# Patient Record
Sex: FEMALE | Race: BLACK OR AFRICAN AMERICAN | NOT HISPANIC OR LATINO | URBAN - METROPOLITAN AREA
[De-identification: names, ages, dates, MRNs, and addresses within clinical notes are randomized per-mention and may not be internally consistent; named-entity substitution may affect disease eponyms.]

---

## 2020-02-06 ENCOUNTER — APPOINTMENT (RX ONLY)
Dept: URBAN - METROPOLITAN AREA CLINIC 1 | Facility: CLINIC | Age: 50
Setting detail: DERMATOLOGY
End: 2020-02-06

## 2020-02-06 DIAGNOSIS — L663 OTHER SPECIFIED DISEASES OF HAIR AND HAIR FOLLICLES: ICD-10-CM

## 2020-02-06 DIAGNOSIS — L73.9 FOLLICULAR DISORDER, UNSPECIFIED: ICD-10-CM

## 2020-02-06 DIAGNOSIS — L738 OTHER SPECIFIED DISEASES OF HAIR AND HAIR FOLLICLES: ICD-10-CM

## 2020-02-06 DIAGNOSIS — L65.9 NONSCARRING HAIR LOSS, UNSPECIFIED: ICD-10-CM | Status: WELL CONTROLLED

## 2020-02-06 DIAGNOSIS — L81.4 OTHER MELANIN HYPERPIGMENTATION: ICD-10-CM

## 2020-02-06 PROBLEM — L02.92 FURUNCLE, UNSPECIFIED: Status: ACTIVE | Noted: 2020-02-06

## 2020-02-06 PROCEDURE — 99213 OFFICE O/P EST LOW 20 MIN: CPT

## 2020-02-06 PROCEDURE — ? PRESCRIPTION

## 2020-02-06 PROCEDURE — ? COUNSELING

## 2020-02-06 RX ORDER — CLINDAMYCIN PHOSPHATE 10 MG/ML
SOLUTION TOPICAL
Qty: 1 | Refills: 3 | Status: ERX | COMMUNITY
Start: 2020-02-06

## 2020-02-06 RX ORDER — HYDROQUINONE 6% 6 G/100G
EMULSION TOPICAL
Qty: 1 | Refills: 0 | Status: ERX | COMMUNITY
Start: 2020-02-06

## 2020-02-06 RX ADMIN — CLINDAMYCIN PHOSPHATE: 10 SOLUTION TOPICAL at 00:00

## 2020-02-06 RX ADMIN — HYDROQUINONE 6%: 6 EMULSION TOPICAL at 00:00

## 2020-02-10 RX ORDER — CLINDAMYCIN PHOSPHATE 10 MG/ML
SOLUTION TOPICAL
Qty: 1 | Refills: 3 | Status: ERX

## 2020-12-02 ENCOUNTER — APPOINTMENT (RX ONLY)
Dept: URBAN - METROPOLITAN AREA CLINIC 33 | Facility: CLINIC | Age: 50
Setting detail: DERMATOLOGY
End: 2020-12-02

## 2020-12-02 DIAGNOSIS — L73.9 FOLLICULAR DISORDER, UNSPECIFIED: ICD-10-CM

## 2020-12-02 DIAGNOSIS — L738 OTHER SPECIFIED DISEASES OF HAIR AND HAIR FOLLICLES: ICD-10-CM

## 2020-12-02 DIAGNOSIS — L63.8 OTHER ALOPECIA AREATA: ICD-10-CM

## 2020-12-02 DIAGNOSIS — L663 OTHER SPECIFIED DISEASES OF HAIR AND HAIR FOLLICLES: ICD-10-CM

## 2020-12-02 PROBLEM — L02.92 FURUNCLE, UNSPECIFIED: Status: ACTIVE | Noted: 2020-12-02

## 2020-12-02 PROCEDURE — 99213 OFFICE O/P EST LOW 20 MIN: CPT | Mod: 95

## 2020-12-02 PROCEDURE — ? ADDITIONAL NOTES

## 2020-12-02 PROCEDURE — ? PRESCRIPTION

## 2020-12-02 RX ORDER — BETAMETHASONE DIPROPIONATE 0.5 MG/G
OINTMENT TOPICAL QHS
Qty: 1 | Refills: 3 | Status: ERX | COMMUNITY
Start: 2020-12-02

## 2020-12-02 RX ORDER — CLINDAMYCIN PHOSPHATE 10 MG/ML
SOLUTION TOPICAL
Qty: 1 | Refills: 3 | Status: ERX

## 2020-12-02 RX ADMIN — BETAMETHASONE DIPROPIONATE: 0.5 OINTMENT TOPICAL at 00:00

## 2020-12-02 NOTE — PROCEDURE: ADDITIONAL NOTES
Detail Level: Generalized
Additional Notes: Pt believes recent hair loss is due to collagen vitamin she tried. RJ explains this is likely not the cause\\n\\nPt will f/u in office for ILK injections

## 2020-12-10 ENCOUNTER — APPOINTMENT (RX ONLY)
Dept: URBAN - METROPOLITAN AREA CLINIC 33 | Facility: CLINIC | Age: 50
Setting detail: DERMATOLOGY
End: 2020-12-10

## 2020-12-10 DIAGNOSIS — L663 OTHER SPECIFIED DISEASES OF HAIR AND HAIR FOLLICLES: ICD-10-CM

## 2020-12-10 DIAGNOSIS — L73.9 FOLLICULAR DISORDER, UNSPECIFIED: ICD-10-CM

## 2020-12-10 DIAGNOSIS — L63.8 OTHER ALOPECIA AREATA: ICD-10-CM | Status: INADEQUATELY CONTROLLED

## 2020-12-10 DIAGNOSIS — L738 OTHER SPECIFIED DISEASES OF HAIR AND HAIR FOLLICLES: ICD-10-CM

## 2020-12-10 PROBLEM — L02.92 FURUNCLE, UNSPECIFIED: Status: ACTIVE | Noted: 2020-12-10

## 2020-12-10 PROCEDURE — ? COUNSELING

## 2020-12-10 PROCEDURE — 11901 INJECT SKIN LESIONS >7: CPT

## 2020-12-10 PROCEDURE — ? INTRALESIONAL KENALOG

## 2020-12-10 PROCEDURE — 99213 OFFICE O/P EST LOW 20 MIN: CPT | Mod: 25

## 2020-12-10 PROCEDURE — ? TREATMENT REGIMEN

## 2020-12-10 PROCEDURE — ? ADDITIONAL NOTES

## 2020-12-10 ASSESSMENT — LOCATION DETAILED DESCRIPTION DERM
LOCATION DETAILED: LEFT CENTRAL FRONTAL SCALP
LOCATION DETAILED: RIGHT CENTRAL PARIETAL SCALP
LOCATION DETAILED: RIGHT INFERIOR PARIETAL SCALP
LOCATION DETAILED: RIGHT MEDIAL FRONTAL SCALP
LOCATION DETAILED: LEFT SUPERIOR LATERAL FOREHEAD
LOCATION DETAILED: LEFT CENTRAL PARIETAL SCALP
LOCATION DETAILED: RIGHT SUPERIOR LATERAL FOREHEAD
LOCATION DETAILED: RIGHT CENTRAL FRONTAL SCALP
LOCATION DETAILED: RIGHT INFERIOR FRONTAL SCALP
LOCATION DETAILED: LEFT MEDIAL FRONTAL SCALP

## 2020-12-10 ASSESSMENT — LOCATION SIMPLE DESCRIPTION DERM
LOCATION SIMPLE: LEFT SCALP
LOCATION SIMPLE: RIGHT SCALP
LOCATION SIMPLE: LEFT FOREHEAD
LOCATION SIMPLE: SCALP
LOCATION SIMPLE: RIGHT FOREHEAD

## 2020-12-10 ASSESSMENT — LOCATION ZONE DERM
LOCATION ZONE: SCALP
LOCATION ZONE: FACE

## 2020-12-10 NOTE — PROCEDURE: INTRALESIONAL KENALOG
Detail Level: Detailed
Concentration Of Solution Injected (Mg/Ml): 4.0
Include Z78.9 (Other Specified Conditions Influencing Health Status) As An Associated Diagnosis?: No
Kenalog Preparation: Kenalog
Administered By (Optional): NP Bonilla
X Size Of Lesion In Cm (Optional): 0
Consent: The risks of atrophy were reviewed with the patient.
Treatment Number (Optional): 1
Medical Necessity Clause: This procedure was medically necessary because the lesions that were treated were:

## 2020-12-10 NOTE — PROCEDURE: TREATMENT REGIMEN
Plan: 12-10-20Clindamycin gel Qd. Prn tender bumps \\nWash CeraVe \\nMoisturizer qam CeraVe
Detail Level: Zone
Plan: 12-10-20\\nIlk 1/9. 1 of 3 today \\nBetamethasone oint. Qhs \\nRecommend:  Continued multivitamin, B complex, Vitamin D, and Biotin

## 2021-01-04 ENCOUNTER — APPOINTMENT (RX ONLY)
Dept: URBAN - METROPOLITAN AREA CLINIC 1 | Facility: CLINIC | Age: 51
Setting detail: DERMATOLOGY
End: 2021-01-04

## 2021-01-04 DIAGNOSIS — L63.8 OTHER ALOPECIA AREATA: ICD-10-CM | Status: IMPROVED

## 2021-01-04 DIAGNOSIS — L73.9 FOLLICULAR DISORDER, UNSPECIFIED: ICD-10-CM | Status: IMPROVED

## 2021-01-04 DIAGNOSIS — L663 OTHER SPECIFIED DISEASES OF HAIR AND HAIR FOLLICLES: ICD-10-CM | Status: IMPROVED

## 2021-01-04 DIAGNOSIS — L738 OTHER SPECIFIED DISEASES OF HAIR AND HAIR FOLLICLES: ICD-10-CM | Status: IMPROVED

## 2021-01-04 PROBLEM — L02.92 FURUNCLE, UNSPECIFIED: Status: ACTIVE | Noted: 2021-01-04

## 2021-01-04 PROCEDURE — ? TREATMENT REGIMEN

## 2021-01-04 PROCEDURE — ? ADDITIONAL NOTES

## 2021-01-04 PROCEDURE — ? INTRALESIONAL KENALOG

## 2021-01-04 PROCEDURE — 99213 OFFICE O/P EST LOW 20 MIN: CPT | Mod: 25

## 2021-01-04 PROCEDURE — 11901 INJECT SKIN LESIONS >7: CPT

## 2021-01-04 PROCEDURE — ? COUNSELING

## 2021-01-04 ASSESSMENT — LOCATION DETAILED DESCRIPTION DERM
LOCATION DETAILED: RIGHT INFERIOR FRONTAL SCALP
LOCATION DETAILED: RIGHT CENTRAL PARIETAL SCALP
LOCATION DETAILED: LEFT CENTRAL PARIETAL SCALP
LOCATION DETAILED: RIGHT INFERIOR PARIETAL SCALP
LOCATION DETAILED: RIGHT SUPERIOR LATERAL FOREHEAD
LOCATION DETAILED: RIGHT CENTRAL FRONTAL SCALP
LOCATION DETAILED: LEFT SUPERIOR LATERAL FOREHEAD
LOCATION DETAILED: LEFT CENTRAL FRONTAL SCALP
LOCATION DETAILED: RIGHT MEDIAL FRONTAL SCALP

## 2021-01-04 ASSESSMENT — LOCATION SIMPLE DESCRIPTION DERM
LOCATION SIMPLE: LEFT FOREHEAD
LOCATION SIMPLE: SCALP
LOCATION SIMPLE: RIGHT SCALP
LOCATION SIMPLE: LEFT SCALP
LOCATION SIMPLE: RIGHT FOREHEAD

## 2021-01-04 ASSESSMENT — LOCATION ZONE DERM
LOCATION ZONE: SCALP
LOCATION ZONE: FACE

## 2021-01-04 NOTE — PROCEDURE: TREATMENT REGIMEN
Plan: 1-4-21\\nIlk 1/9 #2 of 3 today \\nContinue Betamethasone oint
Detail Level: Zone
Plan: 1-4-21\\nClindamycin  gel Qd. Prn tender bumps  neck, chin \\nWash CeraVe  \\nMoisturizer qam CeraVe with sunscreen\\nCerave. With hylaronic acid  qam

## 2021-01-04 NOTE — PROCEDURE: INTRALESIONAL KENALOG
Detail Level: Detailed
Concentration Of Solution Injected (Mg/Ml): 4.0
Include Z78.9 (Other Specified Conditions Influencing Health Status) As An Associated Diagnosis?: No
Kenalog Preparation: Kenalog
Administered By (Optional): NP Bonilla
X Size Of Lesion In Cm (Optional): 0
Consent: The risks of atrophy were reviewed with the patient.
Treatment Number (Optional): 2
Total Volume Injected (Ccs- Only Use Numbers And Decimals): 1
Medical Necessity Clause: This procedure was medically necessary because the lesions that were treated were:

## 2021-02-08 ENCOUNTER — RX ONLY (OUTPATIENT)
Age: 51
Setting detail: RX ONLY
End: 2021-02-08

## 2021-02-08 ENCOUNTER — APPOINTMENT (RX ONLY)
Dept: URBAN - METROPOLITAN AREA CLINIC 1 | Facility: CLINIC | Age: 51
Setting detail: DERMATOLOGY
End: 2021-02-08

## 2021-02-08 DIAGNOSIS — L73.9 FOLLICULAR DISORDER, UNSPECIFIED: ICD-10-CM | Status: RESOLVED

## 2021-02-08 DIAGNOSIS — L738 OTHER SPECIFIED DISEASES OF HAIR AND HAIR FOLLICLES: ICD-10-CM | Status: RESOLVED

## 2021-02-08 DIAGNOSIS — L63.8 OTHER ALOPECIA AREATA: ICD-10-CM | Status: IMPROVED

## 2021-02-08 DIAGNOSIS — L82.1 OTHER SEBORRHEIC KERATOSIS: ICD-10-CM

## 2021-02-08 DIAGNOSIS — L663 OTHER SPECIFIED DISEASES OF HAIR AND HAIR FOLLICLES: ICD-10-CM | Status: RESOLVED

## 2021-02-08 PROBLEM — L02.92 FURUNCLE, UNSPECIFIED: Status: ACTIVE | Noted: 2021-02-08

## 2021-02-08 PROCEDURE — ? INTRALESIONAL KENALOG

## 2021-02-08 PROCEDURE — ? COUNSELING

## 2021-02-08 PROCEDURE — ? ADDITIONAL NOTES

## 2021-02-08 PROCEDURE — ? TREATMENT REGIMEN

## 2021-02-08 PROCEDURE — 11901 INJECT SKIN LESIONS >7: CPT

## 2021-02-08 PROCEDURE — 99213 OFFICE O/P EST LOW 20 MIN: CPT | Mod: 25

## 2021-02-08 RX ORDER — BETAMETHASONE DIPROPIONATE 0.5 MG/G
OINTMENT TOPICAL QHS
Qty: 1 | Refills: 3 | Status: ERX

## 2021-02-08 ASSESSMENT — LOCATION ZONE DERM
LOCATION ZONE: FACE
LOCATION ZONE: TRUNK
LOCATION ZONE: SCALP

## 2021-02-08 ASSESSMENT — LOCATION SIMPLE DESCRIPTION DERM
LOCATION SIMPLE: LEFT SCALP
LOCATION SIMPLE: FRONTAL SCALP
LOCATION SIMPLE: UPPER BACK
LOCATION SIMPLE: LEFT FOREHEAD
LOCATION SIMPLE: RIGHT FOREHEAD
LOCATION SIMPLE: SCALP
LOCATION SIMPLE: RIGHT SCALP
LOCATION SIMPLE: POSTERIOR SCALP

## 2021-02-08 ASSESSMENT — LOCATION DETAILED DESCRIPTION DERM
LOCATION DETAILED: LEFT MEDIAL FRONTAL SCALP
LOCATION DETAILED: LEFT SUPERIOR PARIETAL SCALP
LOCATION DETAILED: RIGHT CENTRAL FRONTAL SCALP
LOCATION DETAILED: RIGHT INFERIOR PARIETAL SCALP
LOCATION DETAILED: RIGHT POSTERIOR PARIETAL SCALP
LOCATION DETAILED: MEDIAL FRONTAL SCALP
LOCATION DETAILED: RIGHT CENTRAL PARIETAL SCALP
LOCATION DETAILED: LEFT OCCIPITAL SCALP
LOCATION DETAILED: RIGHT SUPERIOR OCCIPITAL SCALP
LOCATION DETAILED: RIGHT OCCIPITAL SCALP
LOCATION DETAILED: RIGHT SUPERIOR LATERAL FOREHEAD
LOCATION DETAILED: LEFT LATERAL FRONTAL SCALP
LOCATION DETAILED: LEFT SUPERIOR LATERAL FOREHEAD
LOCATION DETAILED: LEFT SUPERIOR OCCIPITAL SCALP
LOCATION DETAILED: LEFT CENTRAL FRONTAL SCALP
LOCATION DETAILED: LEFT SUPERIOR FOREHEAD
LOCATION DETAILED: INFERIOR THORACIC SPINE
LOCATION DETAILED: LEFT CENTRAL PARIETAL SCALP
LOCATION DETAILED: RIGHT INFERIOR FRONTAL SCALP
LOCATION DETAILED: RIGHT MEDIAL FRONTAL SCALP

## 2021-02-08 NOTE — PROCEDURE: INTRALESIONAL KENALOG
Detail Level: Detailed
Concentration Of Solution Injected (Mg/Ml): 4.0
Include Z78.9 (Other Specified Conditions Influencing Health Status) As An Associated Diagnosis?: No
Kenalog Preparation: Kenalog
Administered By (Optional): NP Bonilla
X Size Of Lesion In Cm (Optional): 0
Consent: The risks of atrophy were reviewed with the patient.
Treatment Number (Optional): 3
Total Volume Injected (Ccs- Only Use Numbers And Decimals): 1
Medical Necessity Clause: This procedure was medically necessary because the lesions that were treated were:

## 2021-02-08 NOTE — PROCEDURE: TREATMENT REGIMEN
Discontinue Regimen: Decrease Betamethasone oint to scalp 2-3 times a week
Plan: 1-4-21\\nIlk 1/9 #2 of 3 today \\nContinue Betamethasone oint\\n\\n2/8/21 improved \\nBreakage decreased\\nNegative pull test
Detail Level: Zone
Discontinue Regimen: Clindamycin gel
Plan: 1-4-21\\nClindamycin  gel Qd. Prn tender bumps  neck, chin \\nWash CeraVe  \\nMoisturizer qam CeraVe with sunscreen\\nCerave. With hylaronic acid  qam\\n\\n2/8/21 resolved

## 2021-07-01 ENCOUNTER — RX ONLY (OUTPATIENT)
Age: 51
Setting detail: RX ONLY
End: 2021-07-01

## 2021-07-01 ENCOUNTER — APPOINTMENT (RX ONLY)
Dept: URBAN - METROPOLITAN AREA CLINIC 33 | Facility: CLINIC | Age: 51
Setting detail: DERMATOLOGY
End: 2021-07-01

## 2021-07-01 DIAGNOSIS — L21.8 OTHER SEBORRHEIC DERMATITIS: ICD-10-CM

## 2021-07-01 DIAGNOSIS — L63.8 OTHER ALOPECIA AREATA: ICD-10-CM

## 2021-07-01 PROBLEM — L65.9 NONSCARRING HAIR LOSS, UNSPECIFIED: Status: ACTIVE | Noted: 2021-07-01

## 2021-07-01 PROCEDURE — ? COUNSELING

## 2021-07-01 PROCEDURE — ? INTRALESIONAL KENALOG

## 2021-07-01 PROCEDURE — ? PRESCRIPTION

## 2021-07-01 PROCEDURE — 11901 INJECT SKIN LESIONS >7: CPT

## 2021-07-01 PROCEDURE — ? PRESCRIPTION MEDICATION MANAGEMENT

## 2021-07-01 PROCEDURE — ? ADDITIONAL NOTES

## 2021-07-01 PROCEDURE — 99213 OFFICE O/P EST LOW 20 MIN: CPT | Mod: 25

## 2021-07-01 RX ORDER — TRIAMCINOLONE ACETONIDE 1 MG/G
OINTMENT TOPICAL
Qty: 1 | Refills: 1 | Status: ERX | COMMUNITY
Start: 2021-07-01

## 2021-07-01 RX ORDER — HALOBETASOL PROPIONATE 0.5 MG/G
OINTMENT TOPICAL
Qty: 1 | Refills: 2 | Status: ERX | COMMUNITY
Start: 2021-07-01

## 2021-07-01 RX ORDER — TRIAMCINOLONE ACETONIDE 1 MG/G
CREAM TOPICAL
Qty: 1 | Refills: 0 | Status: CANCELLED | COMMUNITY
Start: 2021-07-01

## 2021-07-01 RX ADMIN — HALOBETASOL PROPIONATE: 0.5 OINTMENT TOPICAL at 00:00

## 2021-07-01 ASSESSMENT — LOCATION DETAILED DESCRIPTION DERM
LOCATION DETAILED: LEFT SUPERIOR FOREHEAD
LOCATION DETAILED: RIGHT INFERIOR PARIETAL SCALP
LOCATION DETAILED: RIGHT CENTRAL FRONTAL SCALP
LOCATION DETAILED: RIGHT SUPERIOR OCCIPITAL SCALP
LOCATION DETAILED: LEFT CENTRAL PARIETAL SCALP
LOCATION DETAILED: LEFT CENTRAL FRONTAL SCALP
LOCATION DETAILED: LEFT LATERAL FRONTAL SCALP
LOCATION DETAILED: RIGHT POSTERIOR PARIETAL SCALP
LOCATION DETAILED: RIGHT INFERIOR FRONTAL SCALP
LOCATION DETAILED: RIGHT OCCIPITAL SCALP
LOCATION DETAILED: LEFT SUPERIOR PARIETAL SCALP
LOCATION DETAILED: RIGHT SUPERIOR LATERAL FOREHEAD
LOCATION DETAILED: RIGHT CENTRAL PARIETAL SCALP
LOCATION DETAILED: LEFT SUPERIOR LATERAL FOREHEAD
LOCATION DETAILED: LEFT MEDIAL FRONTAL SCALP
LOCATION DETAILED: MEDIAL FRONTAL SCALP
LOCATION DETAILED: LEFT OCCIPITAL SCALP
LOCATION DETAILED: RIGHT MEDIAL FRONTAL SCALP
LOCATION DETAILED: LEFT SUPERIOR OCCIPITAL SCALP

## 2021-07-01 ASSESSMENT — LOCATION SIMPLE DESCRIPTION DERM
LOCATION SIMPLE: FRONTAL SCALP
LOCATION SIMPLE: RIGHT FOREHEAD
LOCATION SIMPLE: LEFT FOREHEAD
LOCATION SIMPLE: LEFT SCALP
LOCATION SIMPLE: SCALP
LOCATION SIMPLE: POSTERIOR SCALP
LOCATION SIMPLE: RIGHT SCALP

## 2021-07-01 ASSESSMENT — LOCATION ZONE DERM
LOCATION ZONE: FACE
LOCATION ZONE: SCALP

## 2021-07-01 NOTE — PROCEDURE: PRESCRIPTION MEDICATION MANAGEMENT
s/p magnesium drip
Detail Level: Zone
Render In Strict Bullet Format?: No
Initiate Treatment: halobetasol propionate 0.05 % topical ointment \\nApply to scalp QHS

## 2021-08-17 ENCOUNTER — APPOINTMENT (RX ONLY)
Dept: URBAN - METROPOLITAN AREA CLINIC 33 | Facility: CLINIC | Age: 51
Setting detail: DERMATOLOGY
End: 2021-08-17

## 2021-08-17 DIAGNOSIS — L21.8 OTHER SEBORRHEIC DERMATITIS: ICD-10-CM | Status: WELL CONTROLLED

## 2021-08-17 DIAGNOSIS — L65.9 NONSCARRING HAIR LOSS, UNSPECIFIED: ICD-10-CM

## 2021-08-17 PROCEDURE — 99213 OFFICE O/P EST LOW 20 MIN: CPT | Mod: 25

## 2021-08-17 PROCEDURE — 11901 INJECT SKIN LESIONS >7: CPT

## 2021-08-17 PROCEDURE — ? COUNSELING

## 2021-08-17 PROCEDURE — ? INTRALESIONAL KENALOG

## 2021-08-17 PROCEDURE — ? PRESCRIPTION MEDICATION MANAGEMENT

## 2021-08-17 ASSESSMENT — LOCATION DETAILED DESCRIPTION DERM
LOCATION DETAILED: LEFT LATERAL TEMPLE
LOCATION DETAILED: LEFT INFERIOR LATERAL FOREHEAD
LOCATION DETAILED: RIGHT SUPERIOR FOREHEAD
LOCATION DETAILED: RIGHT LATERAL FOREHEAD
LOCATION DETAILED: LEFT LATERAL FOREHEAD
LOCATION DETAILED: LEFT SUPERIOR FOREHEAD
LOCATION DETAILED: RIGHT FOREHEAD
LOCATION DETAILED: INFERIOR MID FOREHEAD
LOCATION DETAILED: LEFT FOREHEAD
LOCATION DETAILED: RIGHT LATERAL TEMPLE
LOCATION DETAILED: LEFT INFERIOR FOREHEAD

## 2021-08-17 ASSESSMENT — LOCATION SIMPLE DESCRIPTION DERM
LOCATION SIMPLE: RIGHT FOREHEAD
LOCATION SIMPLE: LEFT TEMPLE
LOCATION SIMPLE: LEFT FOREHEAD
LOCATION SIMPLE: INFERIOR FOREHEAD
LOCATION SIMPLE: RIGHT TEMPLE

## 2021-08-17 ASSESSMENT — LOCATION ZONE DERM: LOCATION ZONE: FACE

## 2021-08-17 NOTE — PROCEDURE: INTRALESIONAL KENALOG
Include Z78.9 (Other Specified Conditions Influencing Health Status) As An Associated Diagnosis?: No
Treatment Number (Optional): 4
Consent: The risks of atrophy were reviewed with the patient.
Kenalog Preparation: Kenalog
Concentration Of Solution Injected (Mg/Ml): 4.0
Total Volume Injected (Ccs- Only Use Numbers And Decimals): .4
Administered By (Optional): Stephanie Estrada
X Size Of Lesion In Cm (Optional): 0
Validate Note Data When Using Inventory: Yes
Medical Necessity Clause: This procedure was medically necessary because the lesions that were treated were:
Detail Level: Detailed

## 2021-08-17 NOTE — PROCEDURE: PRESCRIPTION MEDICATION MANAGEMENT
Plan: Continue halobetasol oint to the scalp a couple times weekly
Detail Level: Zone
Render In Strict Bullet Format?: No

## 2021-09-21 ENCOUNTER — APPOINTMENT (RX ONLY)
Dept: URBAN - METROPOLITAN AREA CLINIC 33 | Facility: CLINIC | Age: 51
Setting detail: DERMATOLOGY
End: 2021-09-21

## 2021-09-21 DIAGNOSIS — L21.8 OTHER SEBORRHEIC DERMATITIS: ICD-10-CM | Status: INADEQUATELY CONTROLLED

## 2021-09-21 DIAGNOSIS — L65.9 NONSCARRING HAIR LOSS, UNSPECIFIED: ICD-10-CM

## 2021-09-21 PROCEDURE — ? INTRALESIONAL KENALOG

## 2021-09-21 PROCEDURE — ? PRESCRIPTION MEDICATION MANAGEMENT

## 2021-09-21 PROCEDURE — 11901 INJECT SKIN LESIONS >7: CPT

## 2021-09-21 PROCEDURE — 99214 OFFICE O/P EST MOD 30 MIN: CPT | Mod: 25

## 2021-09-21 ASSESSMENT — LOCATION SIMPLE DESCRIPTION DERM
LOCATION SIMPLE: LEFT FOREHEAD
LOCATION SIMPLE: RIGHT TEMPLE
LOCATION SIMPLE: RIGHT FOREHEAD
LOCATION SIMPLE: SCALP

## 2021-09-21 ASSESSMENT — LOCATION DETAILED DESCRIPTION DERM
LOCATION DETAILED: RIGHT FOREHEAD
LOCATION DETAILED: LEFT INFERIOR LATERAL FOREHEAD
LOCATION DETAILED: LEFT INFERIOR FOREHEAD
LOCATION DETAILED: LEFT MEDIAL FOREHEAD
LOCATION DETAILED: RIGHT LATERAL TEMPLE
LOCATION DETAILED: RIGHT INFERIOR LATERAL FOREHEAD
LOCATION DETAILED: RIGHT LATERAL FOREHEAD
LOCATION DETAILED: LEFT CENTRAL FRONTAL SCALP
LOCATION DETAILED: RIGHT SUPERIOR FOREHEAD
LOCATION DETAILED: RIGHT MEDIAL FOREHEAD
LOCATION DETAILED: LEFT INFERIOR FRONTAL SCALP

## 2021-09-21 ASSESSMENT — LOCATION ZONE DERM
LOCATION ZONE: FACE
LOCATION ZONE: SCALP

## 2021-09-21 NOTE — PROCEDURE: INTRALESIONAL KENALOG
Validate Note Data When Using Inventory: Yes
Consent: The risks of atrophy were reviewed with the patient.
X Size Of Lesion In Cm (Optional): 0
Kenalog Preparation: Kenalog
Include Z78.9 (Other Specified Conditions Influencing Health Status) As An Associated Diagnosis?: No
Concentration Of Solution Injected (Mg/Ml): 4.0
Total Volume Injected (Ccs- Only Use Numbers And Decimals): 0.7
Detail Level: Detailed
Medical Necessity Clause: This procedure was medically necessary because the lesions that were treated were:
Treatment Number (Optional): 3
Administered By (Optional): Stephanie Estrada

## 2021-11-02 ENCOUNTER — APPOINTMENT (RX ONLY)
Dept: URBAN - METROPOLITAN AREA CLINIC 33 | Facility: CLINIC | Age: 51
Setting detail: DERMATOLOGY
End: 2021-11-02

## 2021-11-02 DIAGNOSIS — L65.9 NONSCARRING HAIR LOSS, UNSPECIFIED: ICD-10-CM

## 2021-11-02 DIAGNOSIS — L21.8 OTHER SEBORRHEIC DERMATITIS: ICD-10-CM | Status: WELL CONTROLLED

## 2021-11-02 PROCEDURE — ? PRESCRIPTION MEDICATION MANAGEMENT

## 2021-11-02 PROCEDURE — 99213 OFFICE O/P EST LOW 20 MIN: CPT | Mod: 25

## 2021-11-02 PROCEDURE — 11901 INJECT SKIN LESIONS >7: CPT

## 2021-11-02 PROCEDURE — ? INTRALESIONAL KENALOG

## 2021-11-02 PROCEDURE — ? COUNSELING

## 2021-11-02 ASSESSMENT — LOCATION DETAILED DESCRIPTION DERM
LOCATION DETAILED: LEFT INFERIOR FRONTAL SCALP
LOCATION DETAILED: RIGHT LATERAL TEMPLE
LOCATION DETAILED: RIGHT SUPERIOR FOREHEAD
LOCATION DETAILED: LEFT MEDIAL FOREHEAD
LOCATION DETAILED: RIGHT MEDIAL FOREHEAD
LOCATION DETAILED: RIGHT LATERAL FOREHEAD
LOCATION DETAILED: LEFT CENTRAL FRONTAL SCALP
LOCATION DETAILED: LEFT INFERIOR LATERAL FOREHEAD
LOCATION DETAILED: LEFT INFERIOR FOREHEAD
LOCATION DETAILED: RIGHT INFERIOR LATERAL FOREHEAD
LOCATION DETAILED: LEFT SUPERIOR PARIETAL SCALP
LOCATION DETAILED: RIGHT FOREHEAD

## 2021-11-02 ASSESSMENT — LOCATION ZONE DERM
LOCATION ZONE: FACE
LOCATION ZONE: SCALP

## 2021-11-02 ASSESSMENT — LOCATION SIMPLE DESCRIPTION DERM
LOCATION SIMPLE: SCALP
LOCATION SIMPLE: RIGHT TEMPLE
LOCATION SIMPLE: LEFT FOREHEAD
LOCATION SIMPLE: RIGHT FOREHEAD

## 2021-11-02 NOTE — PROCEDURE: PRESCRIPTION MEDICATION MANAGEMENT
Render In Strict Bullet Format?: No
Plan: Continue halobetasol oint to the scalp a couple times weekly.
Detail Level: Zone

## 2021-11-02 NOTE — PROCEDURE: INTRALESIONAL KENALOG
Validate Note Data When Using Inventory: Yes
Consent: The risks of atrophy were reviewed with the patient.
X Size Of Lesion In Cm (Optional): 0
Kenalog Preparation: Kenalog with 1% lidocaine with epinephrine and a 1:10 solution of 8.4% sodium bicarbonate
Include Z78.9 (Other Specified Conditions Influencing Health Status) As An Associated Diagnosis?: No
Concentration Of Solution Injected (Mg/Ml): 4.0
Total Volume Injected (Ccs- Only Use Numbers And Decimals): 0.8
Detail Level: Detailed
Medical Necessity Clause: This procedure was medically necessary because the lesions that were treated were:
Treatment Number (Optional): 4
Administered By (Optional): Stephanie Estrada

## 2021-12-01 ENCOUNTER — APPOINTMENT (RX ONLY)
Dept: URBAN - METROPOLITAN AREA CLINIC 33 | Facility: CLINIC | Age: 51
Setting detail: DERMATOLOGY
End: 2021-12-01

## 2021-12-01 DIAGNOSIS — L21.8 OTHER SEBORRHEIC DERMATITIS: ICD-10-CM | Status: WELL CONTROLLED

## 2021-12-01 DIAGNOSIS — L65.9 NONSCARRING HAIR LOSS, UNSPECIFIED: ICD-10-CM

## 2021-12-01 DIAGNOSIS — L82.0 INFLAMED SEBORRHEIC KERATOSIS: ICD-10-CM

## 2021-12-01 PROCEDURE — ? PRESCRIPTION

## 2021-12-01 PROCEDURE — ? COUNSELING

## 2021-12-01 PROCEDURE — ? PRESCRIPTION MEDICATION MANAGEMENT

## 2021-12-01 PROCEDURE — ? INTRALESIONAL KENALOG

## 2021-12-01 PROCEDURE — 17110 DESTRUCTION B9 LES UP TO 14: CPT

## 2021-12-01 PROCEDURE — 11901 INJECT SKIN LESIONS >7: CPT | Mod: 59

## 2021-12-01 PROCEDURE — ? LIQUID NITROGEN

## 2021-12-01 PROCEDURE — 99213 OFFICE O/P EST LOW 20 MIN: CPT | Mod: 25

## 2021-12-01 RX ORDER — TRIAMCINOLONE ACETONIDE 1 MG/G
OINTMENT TOPICAL
Qty: 80 | Refills: 1 | Status: ERX | COMMUNITY
Start: 2021-12-01

## 2021-12-01 RX ADMIN — TRIAMCINOLONE ACETONIDE: 1 OINTMENT TOPICAL at 00:00

## 2021-12-01 ASSESSMENT — LOCATION ZONE DERM
LOCATION ZONE: TRUNK
LOCATION ZONE: FACE
LOCATION ZONE: SCALP

## 2021-12-01 ASSESSMENT — LOCATION DETAILED DESCRIPTION DERM
LOCATION DETAILED: RIGHT INFERIOR MEDIAL MIDBACK
LOCATION DETAILED: RIGHT CENTRAL FRONTAL SCALP
LOCATION DETAILED: RIGHT MEDIAL FRONTAL SCALP
LOCATION DETAILED: LEFT SUPERIOR LATERAL FOREHEAD
LOCATION DETAILED: RIGHT SUPERIOR MEDIAL FOREHEAD
LOCATION DETAILED: MID-FRONTAL SCALP
LOCATION DETAILED: LEFT LATERAL FRONTAL SCALP
LOCATION DETAILED: LEFT CENTRAL FRONTAL SCALP
LOCATION DETAILED: LEFT CENTRAL PARIETAL SCALP
LOCATION DETAILED: RIGHT LATERAL FRONTAL SCALP
LOCATION DETAILED: LEFT SUPERIOR FOREHEAD
LOCATION DETAILED: MID-OCCIPITAL SCALP
LOCATION DETAILED: LEFT SUPERIOR PARIETAL SCALP
LOCATION DETAILED: LEFT SUPERIOR OCCIPITAL SCALP
LOCATION DETAILED: RIGHT SUPERIOR PARIETAL SCALP
LOCATION DETAILED: RIGHT SUPERIOR OCCIPITAL SCALP
LOCATION DETAILED: RIGHT SUPERIOR FOREHEAD

## 2021-12-01 ASSESSMENT — LOCATION SIMPLE DESCRIPTION DERM
LOCATION SIMPLE: LEFT OCCIPITAL SCALP
LOCATION SIMPLE: RIGHT FOREHEAD
LOCATION SIMPLE: SCALP
LOCATION SIMPLE: ANTERIOR SCALP
LOCATION SIMPLE: LEFT FOREHEAD
LOCATION SIMPLE: RIGHT OCCIPITAL SCALP
LOCATION SIMPLE: RIGHT SCALP
LOCATION SIMPLE: LEFT SCALP
LOCATION SIMPLE: RIGHT LOWER BACK
LOCATION SIMPLE: POSTERIOR SCALP

## 2021-12-01 NOTE — PROCEDURE: INTRALESIONAL KENALOG
X Size Of Lesion In Cm (Optional): 0
Administered By (Optional): Stephanie Estrada
Medical Necessity Clause: This procedure was medically necessary because the lesions that were treated were:
Validate Note Data When Using Inventory: Yes
Kenalog Preparation: Kenalog
Include Z78.9 (Other Specified Conditions Influencing Health Status) As An Associated Diagnosis?: No
Treatment Number (Optional): 5
Detail Level: Detailed
Concentration Of Solution Injected (Mg/Ml): 4.0
Total Volume Injected (Ccs- Only Use Numbers And Decimals): 1
Consent: The risks of atrophy were reviewed with the patient.

## 2021-12-01 NOTE — PROCEDURE: PRESCRIPTION MEDICATION MANAGEMENT
Render In Strict Bullet Format?: No
Plan: Continue Triamcinolone oint to the scalp a couple times weekly.
Detail Level: Zone

## 2021-12-01 NOTE — PROCEDURE: LIQUID NITROGEN
Include Z78.9 (Other Specified Conditions Influencing Health Status) As An Associated Diagnosis?: No
Consent: The patient's consent was obtained including but not limited to risks of crusting, scabbing, blistering, scarring, darker or lighter pigmentary change, recurrence, incomplete removal and infection.
Medical Necessity Clause: This procedure was medically necessary because the lesions that were treated were:
Medical Necessity Information: It is in your best interest to select a reason for this procedure from the list below. All of these items fulfill various CMS LCD requirements except the new and changing color options.
Show Topical Anesthesia Variable?: Yes
Post-Care Instructions: I reviewed with the patient in detail post-care instructions. Patient is to wear sunprotection, and avoid picking at any of the treated lesions. Pt may apply Vaseline to crusted or scabbing areas.
Detail Level: Detailed

## 2021-12-01 NOTE — HPI: SECONDARY COMPLAINT
How Severe Is This Condition?: moderate
Additional History: Pt notes intermittent itchiness of low mid back.

## 2021-12-29 ENCOUNTER — APPOINTMENT (RX ONLY)
Dept: URBAN - METROPOLITAN AREA CLINIC 33 | Facility: CLINIC | Age: 51
Setting detail: DERMATOLOGY
End: 2021-12-29

## 2021-12-29 DIAGNOSIS — L63.8 OTHER ALOPECIA AREATA: ICD-10-CM

## 2021-12-29 DIAGNOSIS — L82.0 INFLAMED SEBORRHEIC KERATOSIS: ICD-10-CM

## 2021-12-29 PROCEDURE — ? LIQUID NITROGEN

## 2021-12-29 PROCEDURE — 11901 INJECT SKIN LESIONS >7: CPT | Mod: 59

## 2021-12-29 PROCEDURE — ? INTRALESIONAL KENALOG

## 2021-12-29 PROCEDURE — 17110 DESTRUCTION B9 LES UP TO 14: CPT

## 2021-12-29 ASSESSMENT — LOCATION SIMPLE DESCRIPTION DERM
LOCATION SIMPLE: LEFT OCCIPITAL SCALP
LOCATION SIMPLE: RIGHT SCALP
LOCATION SIMPLE: SCALP
LOCATION SIMPLE: LEFT SCALP
LOCATION SIMPLE: POSTERIOR SCALP
LOCATION SIMPLE: UPPER BACK
LOCATION SIMPLE: RIGHT FOREHEAD
LOCATION SIMPLE: LEFT FOREHEAD
LOCATION SIMPLE: FRONTAL SCALP

## 2021-12-29 ASSESSMENT — LOCATION DETAILED DESCRIPTION DERM
LOCATION DETAILED: LEFT CENTRAL FRONTAL SCALP
LOCATION DETAILED: POSTERIOR MID-PARIETAL SCALP
LOCATION DETAILED: LEFT SUPERIOR LATERAL FOREHEAD
LOCATION DETAILED: RIGHT FOREHEAD
LOCATION DETAILED: RIGHT MEDIAL FRONTAL SCALP
LOCATION DETAILED: RIGHT SUPERIOR PARIETAL SCALP
LOCATION DETAILED: LEFT CENTRAL PARIETAL SCALP
LOCATION DETAILED: LEFT LATERAL FOREHEAD
LOCATION DETAILED: MEDIAL FRONTAL SCALP
LOCATION DETAILED: LEFT SUPERIOR FOREHEAD
LOCATION DETAILED: INFERIOR THORACIC SPINE
LOCATION DETAILED: LEFT SUPERIOR PARIETAL SCALP
LOCATION DETAILED: RIGHT SUPERIOR FOREHEAD
LOCATION DETAILED: LEFT SUPERIOR OCCIPITAL SCALP

## 2021-12-29 ASSESSMENT — LOCATION ZONE DERM
LOCATION ZONE: SCALP
LOCATION ZONE: FACE
LOCATION ZONE: TRUNK

## 2021-12-29 NOTE — PROCEDURE: LIQUID NITROGEN
Show Aperture Variable?: Yes
Render Post-Care Instructions In Note?: no
Post-Care Instructions: I reviewed with the patient in detail post-care instructions. Patient is to wear sunprotection, and avoid picking at any of the treated lesions. Pt may apply Vaseline to crusted or scabbing areas.
Detail Level: Detailed
Consent: The patient's consent was obtained including but not limited to risks of crusting, scabbing, blistering, scarring, darker or lighter pigmentary change, recurrence, incomplete removal and infection.
Medical Necessity Information: It is in your best interest to select a reason for this procedure from the list below. All of these items fulfill various CMS LCD requirements except the new and changing color options.
Medical Necessity Clause: This procedure was medically necessary because the lesions that were treated were:

## 2021-12-29 NOTE — PROCEDURE: INTRALESIONAL KENALOG
Administered By (Optional): Stephanie Estrada
Detail Level: Detailed
Treatment Number (Optional): 6
Medical Necessity Clause: This procedure was medically necessary because the lesions that were treated were:
Validate Note Data When Using Inventory: Yes
Include Z78.9 (Other Specified Conditions Influencing Health Status) As An Associated Diagnosis?: No
Concentration Of Solution Injected (Mg/Ml): 4.0
Kenalog Preparation: Kenalog
Total Volume Injected (Ccs- Only Use Numbers And Decimals): 1
Consent: The risks of atrophy were reviewed with the patient.
X Size Of Lesion In Cm (Optional): 0

## 2022-01-31 ENCOUNTER — APPOINTMENT (RX ONLY)
Dept: URBAN - METROPOLITAN AREA CLINIC 33 | Facility: CLINIC | Age: 52
Setting detail: DERMATOLOGY
End: 2022-01-31

## 2022-01-31 ENCOUNTER — RX ONLY (OUTPATIENT)
Age: 52
Setting detail: RX ONLY
End: 2022-01-31

## 2022-01-31 DIAGNOSIS — L63.8 OTHER ALOPECIA AREATA: ICD-10-CM

## 2022-01-31 PROCEDURE — ? INTRALESIONAL KENALOG

## 2022-01-31 PROCEDURE — ? PRESCRIPTION MEDICATION MANAGEMENT

## 2022-01-31 PROCEDURE — 11901 INJECT SKIN LESIONS >7: CPT

## 2022-01-31 RX ORDER — CLINDAMYCIN PHOSPHATE 10 MG/ML
SOLUTION TOPICAL
Qty: 30 | Refills: 3 | Status: ERX

## 2022-01-31 ASSESSMENT — LOCATION DETAILED DESCRIPTION DERM
LOCATION DETAILED: RIGHT SUPERIOR FOREHEAD
LOCATION DETAILED: RIGHT SUPERIOR PARIETAL SCALP
LOCATION DETAILED: LEFT SUPERIOR FOREHEAD
LOCATION DETAILED: LEFT CENTRAL PARIETAL SCALP
LOCATION DETAILED: LEFT SUPERIOR OCCIPITAL SCALP
LOCATION DETAILED: LEFT SUPERIOR PARIETAL SCALP
LOCATION DETAILED: LEFT LATERAL FOREHEAD
LOCATION DETAILED: POSTERIOR MID-PARIETAL SCALP
LOCATION DETAILED: LEFT SUPERIOR LATERAL FOREHEAD
LOCATION DETAILED: MEDIAL FRONTAL SCALP
LOCATION DETAILED: RIGHT FOREHEAD
LOCATION DETAILED: LEFT CENTRAL FRONTAL SCALP
LOCATION DETAILED: RIGHT MEDIAL FRONTAL SCALP

## 2022-01-31 ASSESSMENT — LOCATION SIMPLE DESCRIPTION DERM
LOCATION SIMPLE: LEFT FOREHEAD
LOCATION SIMPLE: POSTERIOR SCALP
LOCATION SIMPLE: RIGHT SCALP
LOCATION SIMPLE: LEFT SCALP
LOCATION SIMPLE: LEFT OCCIPITAL SCALP
LOCATION SIMPLE: SCALP
LOCATION SIMPLE: FRONTAL SCALP
LOCATION SIMPLE: RIGHT FOREHEAD

## 2022-01-31 ASSESSMENT — LOCATION ZONE DERM
LOCATION ZONE: FACE
LOCATION ZONE: SCALP

## 2022-01-31 NOTE — PROCEDURE: PRESCRIPTION MEDICATION MANAGEMENT
Plan: Discontinue betamethasone ointment to scalp.
Detail Level: Zone
Render In Strict Bullet Format?: No

## 2022-01-31 NOTE — PROCEDURE: INTRALESIONAL KENALOG
Administered By (Optional): Stephanie Estrada
Detail Level: Detailed
Treatment Number (Optional): 7
Medical Necessity Clause: This procedure was medically necessary because the lesions that were treated were:
Validate Note Data When Using Inventory: Yes
Include Z78.9 (Other Specified Conditions Influencing Health Status) As An Associated Diagnosis?: No
Concentration Of Solution Injected (Mg/Ml): 4.0
Kenalog Preparation: Kenalog
Total Volume Injected (Ccs- Only Use Numbers And Decimals): 0.6
Consent: The risks of atrophy were reviewed with the patient.
X Size Of Lesion In Cm (Optional): 0

## 2022-03-02 ENCOUNTER — APPOINTMENT (RX ONLY)
Dept: URBAN - METROPOLITAN AREA CLINIC 33 | Facility: CLINIC | Age: 52
Setting detail: DERMATOLOGY
End: 2022-03-02

## 2022-03-02 DIAGNOSIS — L63.8 OTHER ALOPECIA AREATA: ICD-10-CM

## 2022-03-02 PROCEDURE — 11901 INJECT SKIN LESIONS >7: CPT

## 2022-03-02 PROCEDURE — ? INTRALESIONAL KENALOG

## 2022-03-02 ASSESSMENT — LOCATION ZONE DERM
LOCATION ZONE: FACE
LOCATION ZONE: SCALP

## 2022-03-02 ASSESSMENT — LOCATION SIMPLE DESCRIPTION DERM
LOCATION SIMPLE: RIGHT FOREHEAD
LOCATION SIMPLE: RIGHT SCALP
LOCATION SIMPLE: FRONTAL SCALP
LOCATION SIMPLE: LEFT FOREHEAD
LOCATION SIMPLE: SCALP

## 2022-03-02 ASSESSMENT — LOCATION DETAILED DESCRIPTION DERM
LOCATION DETAILED: LEFT SUPERIOR FOREHEAD
LOCATION DETAILED: RIGHT SUPERIOR PARIETAL SCALP
LOCATION DETAILED: RIGHT SUPERIOR FOREHEAD
LOCATION DETAILED: LEFT SUPERIOR PARIETAL SCALP
LOCATION DETAILED: RIGHT FOREHEAD
LOCATION DETAILED: RIGHT MEDIAL FRONTAL SCALP
LOCATION DETAILED: MEDIAL FRONTAL SCALP

## 2022-03-02 NOTE — PROCEDURE: INTRALESIONAL KENALOG
Administered By (Optional): Stephanie Estrada
Detail Level: Detailed
Treatment Number (Optional): 8
Medical Necessity Clause: This procedure was medically necessary because the lesions that were treated were:
Validate Note Data When Using Inventory: Yes
Include Z78.9 (Other Specified Conditions Influencing Health Status) As An Associated Diagnosis?: No
Concentration Of Solution Injected (Mg/Ml): 4.0
Kenalog Preparation: Kenalog
Total Volume Injected (Ccs- Only Use Numbers And Decimals): 0.6
Consent: The risks of atrophy were reviewed with the patient.
X Size Of Lesion In Cm (Optional): 0

## 2022-03-30 ENCOUNTER — APPOINTMENT (RX ONLY)
Dept: URBAN - METROPOLITAN AREA CLINIC 33 | Facility: CLINIC | Age: 52
Setting detail: DERMATOLOGY
End: 2022-03-30

## 2022-03-30 DIAGNOSIS — L63.8 OTHER ALOPECIA AREATA: ICD-10-CM

## 2022-03-30 PROCEDURE — ? INTRALESIONAL KENALOG

## 2022-03-30 PROCEDURE — 11901 INJECT SKIN LESIONS >7: CPT

## 2022-03-30 ASSESSMENT — LOCATION SIMPLE DESCRIPTION DERM
LOCATION SIMPLE: SCALP
LOCATION SIMPLE: FRONTAL SCALP
LOCATION SIMPLE: LEFT FOREHEAD
LOCATION SIMPLE: RIGHT SCALP
LOCATION SIMPLE: RIGHT FOREHEAD

## 2022-03-30 ASSESSMENT — LOCATION ZONE DERM
LOCATION ZONE: SCALP
LOCATION ZONE: FACE

## 2022-03-30 ASSESSMENT — LOCATION DETAILED DESCRIPTION DERM
LOCATION DETAILED: MEDIAL FRONTAL SCALP
LOCATION DETAILED: RIGHT MEDIAL FRONTAL SCALP
LOCATION DETAILED: RIGHT SUPERIOR PARIETAL SCALP
LOCATION DETAILED: RIGHT SUPERIOR FOREHEAD
LOCATION DETAILED: LEFT SUPERIOR FOREHEAD
LOCATION DETAILED: RIGHT FOREHEAD
LOCATION DETAILED: LEFT SUPERIOR PARIETAL SCALP

## 2022-03-30 NOTE — PROCEDURE: INTRALESIONAL KENALOG
Administered By (Optional): Stephanie Estrada
Detail Level: Detailed
Treatment Number (Optional): 9
Medical Necessity Clause: This procedure was medically necessary because the lesions that were treated were:
Validate Note Data When Using Inventory: Yes
Include Z78.9 (Other Specified Conditions Influencing Health Status) As An Associated Diagnosis?: No
Concentration Of Solution Injected (Mg/Ml): 4.0
Kenalog Preparation: Kenalog
Total Volume Injected (Ccs- Only Use Numbers And Decimals): 0.4
Consent: The risks of atrophy were reviewed with the patient.
X Size Of Lesion In Cm (Optional): 0

## 2022-05-11 ENCOUNTER — APPOINTMENT (RX ONLY)
Dept: URBAN - METROPOLITAN AREA CLINIC 33 | Facility: CLINIC | Age: 52
Setting detail: DERMATOLOGY
End: 2022-05-11

## 2022-05-11 DIAGNOSIS — L63.8 OTHER ALOPECIA AREATA: ICD-10-CM | Status: IMPROVED

## 2022-05-11 PROCEDURE — ? INTRALESIONAL KENALOG

## 2022-05-11 PROCEDURE — 11901 INJECT SKIN LESIONS >7: CPT

## 2022-05-11 ASSESSMENT — LOCATION SIMPLE DESCRIPTION DERM
LOCATION SIMPLE: RIGHT SCALP
LOCATION SIMPLE: RIGHT FOREHEAD
LOCATION SIMPLE: LEFT FOREHEAD
LOCATION SIMPLE: FRONTAL SCALP
LOCATION SIMPLE: SCALP

## 2022-05-11 ASSESSMENT — LOCATION DETAILED DESCRIPTION DERM
LOCATION DETAILED: LEFT SUPERIOR FOREHEAD
LOCATION DETAILED: RIGHT FOREHEAD
LOCATION DETAILED: MEDIAL FRONTAL SCALP
LOCATION DETAILED: LEFT SUPERIOR PARIETAL SCALP
LOCATION DETAILED: RIGHT SUPERIOR PARIETAL SCALP
LOCATION DETAILED: RIGHT MEDIAL FRONTAL SCALP
LOCATION DETAILED: RIGHT SUPERIOR FOREHEAD

## 2022-05-11 ASSESSMENT — LOCATION ZONE DERM
LOCATION ZONE: FACE
LOCATION ZONE: SCALP

## 2022-05-11 NOTE — PROCEDURE: INTRALESIONAL KENALOG
Administered By (Optional): Stephanie Estrada
Detail Level: Detailed
Treatment Number (Optional): 10
Medical Necessity Clause: This procedure was medically necessary because the lesions that were treated were:
Validate Note Data When Using Inventory: Yes
Include Z78.9 (Other Specified Conditions Influencing Health Status) As An Associated Diagnosis?: No
Concentration Of Solution Injected (Mg/Ml): 4.0
Kenalog Preparation: Kenalog
Total Volume Injected (Ccs- Only Use Numbers And Decimals): 0.5
Consent: The risks of atrophy were reviewed with the patient.
X Size Of Lesion In Cm (Optional): 0

## 2022-08-10 ENCOUNTER — APPOINTMENT (RX ONLY)
Dept: URBAN - METROPOLITAN AREA CLINIC 33 | Facility: CLINIC | Age: 52
Setting detail: DERMATOLOGY
End: 2022-08-10

## 2022-08-10 DIAGNOSIS — L63.8 OTHER ALOPECIA AREATA: ICD-10-CM

## 2022-08-10 PROCEDURE — 11901 INJECT SKIN LESIONS >7: CPT

## 2022-08-10 PROCEDURE — ? PRESCRIPTION MEDICATION MANAGEMENT

## 2022-08-10 PROCEDURE — ? INTRALESIONAL KENALOG

## 2022-08-10 ASSESSMENT — LOCATION SIMPLE DESCRIPTION DERM
LOCATION SIMPLE: RIGHT FOREHEAD
LOCATION SIMPLE: RIGHT SCALP
LOCATION SIMPLE: SCALP
LOCATION SIMPLE: LEFT TEMPLE
LOCATION SIMPLE: LEFT FOREHEAD
LOCATION SIMPLE: LEFT SCALP

## 2022-08-10 ASSESSMENT — LOCATION DETAILED DESCRIPTION DERM
LOCATION DETAILED: LEFT CENTRAL FRONTAL SCALP
LOCATION DETAILED: LEFT SUPERIOR FRONTAL SCALP
LOCATION DETAILED: LEFT SUPERIOR MEDIAL FOREHEAD
LOCATION DETAILED: LEFT LATERAL FRONTAL SCALP
LOCATION DETAILED: RIGHT INFERIOR FRONTAL SCALP
LOCATION DETAILED: LEFT SUPERIOR FOREHEAD
LOCATION DETAILED: LEFT LATERAL TEMPLE
LOCATION DETAILED: RIGHT SUPERIOR MEDIAL FOREHEAD
LOCATION DETAILED: RIGHT SUPERIOR FRONTAL SCALP
LOCATION DETAILED: RIGHT LATERAL FRONTAL SCALP
LOCATION DETAILED: RIGHT CENTRAL FRONTAL SCALP
LOCATION DETAILED: RIGHT SUPERIOR FOREHEAD

## 2022-08-10 ASSESSMENT — LOCATION ZONE DERM
LOCATION ZONE: SCALP
LOCATION ZONE: FACE

## 2022-08-10 NOTE — PROCEDURE: INTRALESIONAL KENALOG
Detail Level: Detailed
Administered By (Optional): Stephanie Estrada
Total Volume Injected (Ccs- Only Use Numbers And Decimals): 0.4
How Many Mls Were Removed From The 10 Mg/Ml (5ml) Vial When Preparing The Injectable Solution?: 0
Kenalog Preparation: Kenalog
Medical Necessity Clause: This procedure was medically necessary because the lesions that were treated were:
Validate Note Data When Using Inventory: Yes
Consent: The risks of atrophy were reviewed with the patient.
Concentration Of Solution Injected (Mg/Ml): 4.0
Include Z78.9 (Other Specified Conditions Influencing Health Status) As An Associated Diagnosis?: No

## 2022-08-10 NOTE — PROCEDURE: PRESCRIPTION MEDICATION MANAGEMENT
Detail Level: Simple
Render In Strict Bullet Format?: No
Plan: She complained of some eye lashes missing, recommended latisse but she declined treatment at this time. We will monitor.

## 2023-01-05 ENCOUNTER — APPOINTMENT (RX ONLY)
Dept: URBAN - METROPOLITAN AREA CLINIC 33 | Facility: CLINIC | Age: 53
Setting detail: DERMATOLOGY
End: 2023-01-05

## 2023-01-05 DIAGNOSIS — L63.8 OTHER ALOPECIA AREATA: ICD-10-CM

## 2023-01-05 PROCEDURE — 99213 OFFICE O/P EST LOW 20 MIN: CPT

## 2023-01-05 PROCEDURE — ? COUNSELING

## 2023-01-05 PROCEDURE — ? PRESCRIPTION

## 2023-01-05 RX ORDER — CLOBETASOL PROPIONATE 0.5 MG/G
OINTMENT TOPICAL
Qty: 60 | Refills: 2 | Status: ERX | COMMUNITY
Start: 2023-01-05

## 2023-01-05 RX ADMIN — CLOBETASOL PROPIONATE: 0.5 OINTMENT TOPICAL at 00:00

## 2023-01-05 NOTE — PROCEDURE: COUNSELING
Patient Name: Gera Suazo  Caller Name: Gera Suazo  Name of Facility: n/a  Callback Number: [phone] (M)  Best Availability: Before 3pm.  Can A Detailed Message Be left? yes  Fax Number: n/a  Additional Info: Patient states that she received a call from the clinic, but is unsure who called, or for what reason. Patient is requesting a return call. Please advise.   Did you confirm the message with the caller?: yes    Thank you,  Cain Damian     Detail Level: Detailed

## 2023-08-17 ENCOUNTER — APPOINTMENT (RX ONLY)
Dept: URBAN - METROPOLITAN AREA CLINIC 1 | Facility: CLINIC | Age: 53
Setting detail: DERMATOLOGY
End: 2023-08-17

## 2023-08-17 DIAGNOSIS — L21.8 OTHER SEBORRHEIC DERMATITIS: ICD-10-CM

## 2023-08-17 DIAGNOSIS — L63.8 OTHER ALOPECIA AREATA: ICD-10-CM

## 2023-08-17 PROCEDURE — ? PRESCRIPTION

## 2023-08-17 PROCEDURE — ? COUNSELING

## 2023-08-17 PROCEDURE — 11901 INJECT SKIN LESIONS >7: CPT

## 2023-08-17 PROCEDURE — 99213 OFFICE O/P EST LOW 20 MIN: CPT | Mod: 25

## 2023-08-17 PROCEDURE — ? PRESCRIPTION MEDICATION MANAGEMENT

## 2023-08-17 PROCEDURE — ? INTRALESIONAL KENALOG

## 2023-08-17 RX ORDER — TRIAMCINOLONE ACETONIDE 1 MG/G
OINTMENT TOPICAL
Qty: 80 | Refills: 2 | Status: ERX | COMMUNITY
Start: 2023-08-17

## 2023-08-17 RX ADMIN — TRIAMCINOLONE ACETONIDE: 1 OINTMENT TOPICAL at 00:00

## 2023-08-17 ASSESSMENT — LOCATION DETAILED DESCRIPTION DERM
LOCATION DETAILED: RIGHT SUPERIOR PARIETAL SCALP
LOCATION DETAILED: RIGHT MEDIAL FRONTAL SCALP

## 2023-08-17 ASSESSMENT — LOCATION SIMPLE DESCRIPTION DERM
LOCATION SIMPLE: SCALP
LOCATION SIMPLE: RIGHT SCALP

## 2023-08-17 ASSESSMENT — LOCATION ZONE DERM: LOCATION ZONE: SCALP

## 2023-08-17 NOTE — PROCEDURE: PRESCRIPTION MEDICATION MANAGEMENT
Render In Strict Bullet Format?: No
Initiate Treatment: Apply Triamcinolone 0.1% ointment to the scalp QHS
Plan: Clobetasol 0.05% ointment not covered through insurance. Rx Triamcinolone 0.1% ointment sent to pt pharmacy.
Detail Level: Zone

## 2023-08-17 NOTE — PROCEDURE: INTRALESIONAL KENALOG
Lot # (Optional): [id number]/ NDC [id number] Lot # For Kenalog (Optional): [id number]/ NDC [id number]

## 2023-11-15 ENCOUNTER — APPOINTMENT (RX ONLY)
Dept: URBAN - METROPOLITAN AREA CLINIC 1 | Facility: CLINIC | Age: 53
Setting detail: DERMATOLOGY
End: 2023-11-15

## 2023-11-15 DIAGNOSIS — L21.8 OTHER SEBORRHEIC DERMATITIS: ICD-10-CM | Status: STABLE

## 2023-11-15 DIAGNOSIS — L63.8 OTHER ALOPECIA AREATA: ICD-10-CM | Status: STABLE

## 2023-11-15 PROCEDURE — ? INTRALESIONAL KENALOG

## 2023-11-15 PROCEDURE — ? PRESCRIPTION MEDICATION MANAGEMENT

## 2023-11-15 PROCEDURE — 99213 OFFICE O/P EST LOW 20 MIN: CPT | Mod: 25

## 2023-11-15 PROCEDURE — ? COUNSELING

## 2023-11-15 PROCEDURE — 11901 INJECT SKIN LESIONS >7: CPT

## 2023-11-15 ASSESSMENT — LOCATION DETAILED DESCRIPTION DERM
LOCATION DETAILED: RIGHT MEDIAL FRONTAL SCALP
LOCATION DETAILED: RIGHT SUPERIOR PARIETAL SCALP

## 2023-11-15 ASSESSMENT — LOCATION SIMPLE DESCRIPTION DERM
LOCATION SIMPLE: SCALP
LOCATION SIMPLE: RIGHT SCALP

## 2023-11-15 ASSESSMENT — LOCATION ZONE DERM: LOCATION ZONE: SCALP

## 2023-11-15 NOTE — PROCEDURE: PRESCRIPTION MEDICATION MANAGEMENT
Continue Regimen: Apply Triamcinolone 0.1% ointment to the scalp QHS
Render In Strict Bullet Format?: No
Plan: Clobetasol 0.05% ointment not covered through insurance. Rx Triamcinolone 0.1% ointment sent to pt pharmacy.
Detail Level: Zone

## 2023-11-15 NOTE — PROCEDURE: INTRALESIONAL KENALOG
Administered By (Optional): Adrienne Kumar
Lot # For Kenalog (Optional): [id number]/ NDC [id number]
How Many Mls Were Removed From The 40 Mg/Ml (5ml) Vial When Preparing The Injectable Solution?: 0
Total Volume (Ccs): 0.3
Kenalog Preparation: Kenalog
Expiration Date For Kenalog (Optional): 2/2025
Consent: The risks of atrophy were reviewed with the patient.
Bill For Wasted Drug (Kenalog)?: no
Validate Note Data When Using Inventory: Yes
Concentration Of Kenalog Solution Injected (Mg/Ml): 4.0
Detail Level: Simple
Kenalog Type Of Vial: Multiple Dose
Medical Necessity Clause: This procedure was medically necessary because the lesions that were treated were:

## 2024-02-08 ENCOUNTER — APPOINTMENT (RX ONLY)
Dept: URBAN - METROPOLITAN AREA CLINIC 1 | Facility: CLINIC | Age: 54
Setting detail: DERMATOLOGY
End: 2024-02-08

## 2024-02-08 DIAGNOSIS — L63.8 OTHER ALOPECIA AREATA: ICD-10-CM | Status: IMPROVED

## 2024-02-08 DIAGNOSIS — L21.8 OTHER SEBORRHEIC DERMATITIS: ICD-10-CM | Status: WELL CONTROLLED

## 2024-02-08 PROCEDURE — 11901 INJECT SKIN LESIONS >7: CPT

## 2024-02-08 PROCEDURE — ? DIAGNOSIS COMMENT

## 2024-02-08 PROCEDURE — 99213 OFFICE O/P EST LOW 20 MIN: CPT | Mod: 25

## 2024-02-08 PROCEDURE — ? PRESCRIPTION MEDICATION MANAGEMENT

## 2024-02-08 PROCEDURE — ? INTRALESIONAL KENALOG

## 2024-02-08 PROCEDURE — ? PRESCRIPTION

## 2024-02-08 PROCEDURE — ? COUNSELING

## 2024-02-08 RX ORDER — CICLOPIROX 10 MG/.96ML
SHAMPOO TOPICAL
Qty: 120 | Refills: 2 | Status: ERX | COMMUNITY
Start: 2024-02-08

## 2024-02-08 RX ADMIN — CICLOPIROX: 10 SHAMPOO TOPICAL at 00:00

## 2024-02-08 ASSESSMENT — LOCATION ZONE DERM: LOCATION ZONE: SCALP

## 2024-02-08 ASSESSMENT — LOCATION DETAILED DESCRIPTION DERM
LOCATION DETAILED: RIGHT MEDIAL FRONTAL SCALP
LOCATION DETAILED: RIGHT SUPERIOR PARIETAL SCALP

## 2024-02-08 ASSESSMENT — LOCATION SIMPLE DESCRIPTION DERM
LOCATION SIMPLE: SCALP
LOCATION SIMPLE: RIGHT SCALP

## 2024-02-08 NOTE — PROCEDURE: INTRALESIONAL KENALOG
Administered By (Optional): Adrienne Kumar
How Many Mls Were Removed From The 40 Mg/Ml (5ml) Vial When Preparing The Injectable Solution?: 0
Total Volume (Ccs): 0.3
Kenalog Preparation: Kenalog
Consent: The risks of atrophy were reviewed with the patient.
Bill For Wasted Drug (Kenalog)?: no
Validate Note Data When Using Inventory: Yes
Concentration Of Kenalog Solution Injected (Mg/Ml): 4.0
Detail Level: Simple
Ndc# For Kenalog Only: 8241-0060-99
Kenalog Type Of Vial: Multiple Dose
Medical Necessity Clause: This procedure was medically necessary because the lesions that were treated were:

## 2024-02-08 NOTE — PROCEDURE: PRESCRIPTION MEDICATION MANAGEMENT
Initiate Treatment: ciclopirox 1 % shampoo \\nQuantity: 120.0 ml  Days Supply: 90\\nSig: Apply to scalp biweekly let sit for 5-10mins then rinse & follow with conditioning shampoo
Detail Level: Zone
Render In Strict Bullet Format?: No

## 2024-05-16 ENCOUNTER — APPOINTMENT (RX ONLY)
Dept: URBAN - METROPOLITAN AREA CLINIC 1 | Facility: CLINIC | Age: 54
Setting detail: DERMATOLOGY
End: 2024-05-16

## 2024-05-16 DIAGNOSIS — L63.8 OTHER ALOPECIA AREATA: ICD-10-CM

## 2024-05-16 DIAGNOSIS — L21.8 OTHER SEBORRHEIC DERMATITIS: ICD-10-CM

## 2024-05-16 PROCEDURE — ? INTRALESIONAL KENALOG

## 2024-05-16 PROCEDURE — ? COUNSELING

## 2024-05-16 PROCEDURE — ? PRESCRIPTION MEDICATION MANAGEMENT

## 2024-05-16 PROCEDURE — 11901 INJECT SKIN LESIONS >7: CPT

## 2024-05-16 PROCEDURE — 99213 OFFICE O/P EST LOW 20 MIN: CPT | Mod: 25

## 2024-05-16 ASSESSMENT — LOCATION DETAILED DESCRIPTION DERM
LOCATION DETAILED: LEFT MEDIAL FRONTAL SCALP
LOCATION DETAILED: RIGHT MEDIAL FRONTAL SCALP
LOCATION DETAILED: RIGHT SUPERIOR PARIETAL SCALP

## 2024-05-16 ASSESSMENT — LOCATION SIMPLE DESCRIPTION DERM
LOCATION SIMPLE: RIGHT SCALP
LOCATION SIMPLE: SCALP
LOCATION SIMPLE: LEFT SCALP

## 2024-05-16 ASSESSMENT — LOCATION ZONE DERM: LOCATION ZONE: SCALP

## 2024-05-16 NOTE — PROCEDURE: INTRALESIONAL KENALOG
Administered By (Optional): Adrienne Kumar
How Many Mls Were Removed From The 40 Mg/Ml (5ml) Vial When Preparing The Injectable Solution?: 0
Total Volume (Ccs): 0.3
Kenalog Preparation: Kenalog
Consent: The risks of atrophy were reviewed with the patient.
Bill For Wasted Drug (Kenalog)?: no
Validate Note Data When Using Inventory: Yes
Concentration Of Kenalog Solution Injected (Mg/Ml): 4.0
Detail Level: Simple
Ndc# For Kenalog Only: 3629-8622-96
Kenalog Type Of Vial: Multiple Dose
Medical Necessity Clause: This procedure was medically necessary because the lesions that were treated were:

## 2024-05-16 NOTE — PROCEDURE: PRESCRIPTION MEDICATION MANAGEMENT
Detail Level: Zone
Continue Regimen: ciclopirox 1 % shampoo Apply to scalp biweekly let sit for 5-10mins then rinse & follow with conditioning shampoo
Render In Strict Bullet Format?: No

## 2024-08-22 ENCOUNTER — APPOINTMENT (RX ONLY)
Dept: URBAN - METROPOLITAN AREA CLINIC 1 | Facility: CLINIC | Age: 54
Setting detail: DERMATOLOGY
End: 2024-08-22

## 2024-08-22 DIAGNOSIS — L63.8 OTHER ALOPECIA AREATA: ICD-10-CM

## 2024-08-22 DIAGNOSIS — L21.8 OTHER SEBORRHEIC DERMATITIS: ICD-10-CM

## 2024-08-22 PROCEDURE — ? INTRALESIONAL KENALOG

## 2024-08-22 PROCEDURE — 99213 OFFICE O/P EST LOW 20 MIN: CPT | Mod: 25

## 2024-08-22 PROCEDURE — 11901 INJECT SKIN LESIONS >7: CPT

## 2024-08-22 PROCEDURE — ? COUNSELING

## 2024-08-22 PROCEDURE — ? PRESCRIPTION

## 2024-08-22 PROCEDURE — ? PRESCRIPTION MEDICATION MANAGEMENT

## 2024-08-22 RX ORDER — FLUOCINOLONE ACETONIDE 0.11 MG/ML
OIL TOPICAL
Qty: 118.28 | Refills: 3 | Status: ERX | COMMUNITY
Start: 2024-08-22

## 2024-08-22 RX ORDER — BETAMETHASONE DIPROPIONATE 0.5 MG/G
OINTMENT TOPICAL
Qty: 45 | Refills: 2 | Status: ERX | COMMUNITY
Start: 2024-08-22

## 2024-08-22 RX ADMIN — FLUOCINOLONE ACETONIDE: 0.11 OIL TOPICAL at 00:00

## 2024-08-22 RX ADMIN — BETAMETHASONE DIPROPIONATE: 0.5 OINTMENT TOPICAL at 00:00

## 2024-08-22 ASSESSMENT — LOCATION SIMPLE DESCRIPTION DERM
LOCATION SIMPLE: LEFT SCALP
LOCATION SIMPLE: RIGHT SCALP
LOCATION SIMPLE: SCALP

## 2024-08-22 ASSESSMENT — LOCATION DETAILED DESCRIPTION DERM
LOCATION DETAILED: LEFT MEDIAL FRONTAL SCALP
LOCATION DETAILED: RIGHT SUPERIOR PARIETAL SCALP
LOCATION DETAILED: RIGHT MEDIAL FRONTAL SCALP

## 2024-08-22 ASSESSMENT — LOCATION ZONE DERM: LOCATION ZONE: SCALP

## 2024-08-22 NOTE — PROCEDURE: INTRALESIONAL KENALOG
Administered By (Optional): Adrienne Kumar MD
How Many Mls Were Removed From The 40 Mg/Ml (5ml) Vial When Preparing The Injectable Solution?: 0
Total Volume (Ccs): 0.3
Kenalog Preparation: Kenalog
Consent: The risks of atrophy were reviewed with the patient.
Bill For Wasted Drug (Kenalog)?: no
Validate Note Data When Using Inventory: Yes
Concentration Of Kenalog Solution Injected (Mg/Ml): 4.0
Detail Level: Simple
Kenalog Type Of Vial: Multiple Dose
Medical Necessity Clause: This procedure was medically necessary because the lesions that were treated were:

## 2024-08-22 NOTE — PROCEDURE: PRESCRIPTION MEDICATION MANAGEMENT
Initiate Treatment: betamethasone dipropionate 0.05 % topical ointment \\nQuantity: 45.0 g  Days Supply: 30\\nSig: Apply to the scalp QHS\\n\\nDerma-Smoothe/FS Scalp Oil 0.01 % \\nQuantity: 118.28 ml\\nSig: Apply to scalp 3x weekly as needed for flares
Detail Level: Zone
Render In Strict Bullet Format?: No

## 2024-11-21 ENCOUNTER — RX ONLY (OUTPATIENT)
Age: 54
Setting detail: RX ONLY
End: 2024-11-21

## 2024-11-21 ENCOUNTER — APPOINTMENT (RX ONLY)
Dept: URBAN - METROPOLITAN AREA CLINIC 1 | Facility: CLINIC | Age: 54
Setting detail: DERMATOLOGY
End: 2024-11-21

## 2024-11-21 DIAGNOSIS — L63.8 OTHER ALOPECIA AREATA: ICD-10-CM | Status: IMPROVED

## 2024-11-21 DIAGNOSIS — L21.8 OTHER SEBORRHEIC DERMATITIS: ICD-10-CM | Status: IMPROVED

## 2024-11-21 PROCEDURE — ? PRESCRIPTION MEDICATION MANAGEMENT

## 2024-11-21 PROCEDURE — 11901 INJECT SKIN LESIONS >7: CPT

## 2024-11-21 PROCEDURE — ? COUNSELING

## 2024-11-21 PROCEDURE — 99213 OFFICE O/P EST LOW 20 MIN: CPT | Mod: 25

## 2024-11-21 PROCEDURE — ? INTRALESIONAL KENALOG

## 2024-11-21 RX ORDER — BETAMETHASONE DIPROPIONATE 0.5 MG/G
OINTMENT TOPICAL
Qty: 45 | Refills: 2 | Status: ERX

## 2024-11-21 RX ORDER — CICLOPIROX 10 MG/.96ML
SHAMPOO TOPICAL
Qty: 120 | Refills: 2 | Status: ERX

## 2024-11-21 ASSESSMENT — LOCATION DETAILED DESCRIPTION DERM
LOCATION DETAILED: RIGHT SUPERIOR PARIETAL SCALP
LOCATION DETAILED: LEFT MEDIAL FRONTAL SCALP
LOCATION DETAILED: RIGHT MEDIAL FRONTAL SCALP

## 2024-11-21 ASSESSMENT — LOCATION SIMPLE DESCRIPTION DERM
LOCATION SIMPLE: SCALP
LOCATION SIMPLE: RIGHT SCALP
LOCATION SIMPLE: LEFT SCALP

## 2024-11-21 ASSESSMENT — LOCATION ZONE DERM: LOCATION ZONE: SCALP

## 2024-11-21 NOTE — PROCEDURE: PRESCRIPTION MEDICATION MANAGEMENT
Detail Level: Zone
Render In Strict Bullet Format?: No
Modify Regimen: betamethasone dipropionate 0.05 % topical ointment \\nSig: Apply to the scalp QHS\\n\\nCiclopirox shampoo \\nSig: use to cleanse scalp every hair wash
Plan: Refills sent today.
Oriented - self; Oriented - place; Oriented - time

## 2025-02-20 ENCOUNTER — APPOINTMENT (OUTPATIENT)
Dept: URBAN - METROPOLITAN AREA CLINIC 1 | Facility: CLINIC | Age: 55
Setting detail: DERMATOLOGY
End: 2025-02-20

## 2025-02-20 DIAGNOSIS — L63.8 OTHER ALOPECIA AREATA: ICD-10-CM | Status: WELL CONTROLLED

## 2025-02-20 DIAGNOSIS — L21.8 OTHER SEBORRHEIC DERMATITIS: ICD-10-CM | Status: INADEQUATELY CONTROLLED

## 2025-02-20 PROCEDURE — ? OTC TREATMENT REGIMEN

## 2025-02-20 PROCEDURE — ? DIAGNOSIS COMMENT

## 2025-02-20 PROCEDURE — 99214 OFFICE O/P EST MOD 30 MIN: CPT | Mod: 25

## 2025-02-20 PROCEDURE — ? INTRALESIONAL KENALOG

## 2025-02-20 PROCEDURE — 11901 INJECT SKIN LESIONS >7: CPT

## 2025-02-20 PROCEDURE — ? PRESCRIPTION MEDICATION MANAGEMENT

## 2025-02-20 PROCEDURE — ? COUNSELING

## 2025-02-20 ASSESSMENT — LOCATION SIMPLE DESCRIPTION DERM
LOCATION SIMPLE: SCALP
LOCATION SIMPLE: LEFT SCALP
LOCATION SIMPLE: RIGHT SCALP

## 2025-02-20 ASSESSMENT — LOCATION ZONE DERM: LOCATION ZONE: SCALP

## 2025-02-20 NOTE — PROCEDURE: OTC TREATMENT REGIMEN
Patient Specific Otc Recommendations (Will Not Stick From Patient To Patient): RAJEEV recommended Rosemary oil QD
Detail Level: Zone

## 2025-02-20 NOTE — PROCEDURE: PRESCRIPTION MEDICATION MANAGEMENT
Discontinue Regimen: betamethasone dipropionate 0.05 % topical ointment : Apply to the scalp QHS\\n\\nCiclopirox shampoo : use to cleanse scalp every hair wash
Detail Level: Zone
Render In Strict Bullet Format?: No
Defer Treatment (Provide Reason For Deferment In Text Field Below): Pt does not want to use medications prescribed.

## 2025-02-20 NOTE — PROCEDURE: INTRALESIONAL KENALOG
Administered By (Optional): Adrienne Kumar MD
Lot # For Kenalog (Optional): [id number]
How Many Mls Were Removed From The 40 Mg/Ml (5ml) Vial When Preparing The Injectable Solution?: 0
Total Volume (Ccs): 0.3
Kenalog Preparation: Kenalog
Expiration Date For Kenalog (Optional): 2025/12
Consent: The risks of atrophy were reviewed with the patient.
Bill For Wasted Drug (Kenalog)?: no
Validate Note Data When Using Inventory: Yes
Concentration Of Kenalog Solution Injected (Mg/Ml): 4.0
Detail Level: Detailed
Ndc# For Kenalog Only: [id number]
Kenalog Type Of Vial: Multiple Dose
Medical Necessity Clause: This procedure was medically necessary because the lesions that were treated were:

## 2025-02-20 NOTE — PROCEDURE: DIAGNOSIS COMMENT
Render Risk Assessment In Note?: no
Comment: Patient does not want to use topicals
Detail Level: Simple

## 2025-05-22 ENCOUNTER — APPOINTMENT (OUTPATIENT)
Dept: URBAN - METROPOLITAN AREA CLINIC 1 | Facility: CLINIC | Age: 55
Setting detail: DERMATOLOGY
End: 2025-05-22

## 2025-05-22 DIAGNOSIS — L63.8 OTHER ALOPECIA AREATA: ICD-10-CM | Status: STABLE

## 2025-05-22 DIAGNOSIS — L21.8 OTHER SEBORRHEIC DERMATITIS: ICD-10-CM | Status: STABLE

## 2025-05-22 PROCEDURE — ? DIAGNOSIS COMMENT

## 2025-05-22 PROCEDURE — ? PRESCRIPTION

## 2025-05-22 PROCEDURE — ? OTC TREATMENT REGIMEN

## 2025-05-22 PROCEDURE — ? MDM - TREATMENT GOALS

## 2025-05-22 PROCEDURE — ? COUNSELING

## 2025-05-22 PROCEDURE — ? PRESCRIPTION MEDICATION MANAGEMENT

## 2025-05-22 PROCEDURE — 99213 OFFICE O/P EST LOW 20 MIN: CPT | Mod: 25

## 2025-05-22 PROCEDURE — 11901 INJECT SKIN LESIONS >7: CPT

## 2025-05-22 PROCEDURE — ? INTRALESIONAL KENALOG

## 2025-05-22 RX ORDER — BETAMETHASONE DIPROPIONATE 0.5 MG/G
OINTMENT TOPICAL
Qty: 45 | Refills: 4 | Status: ERX | COMMUNITY
Start: 2025-05-22

## 2025-05-22 RX ORDER — CICLOPIROX 10 MG/.96ML
SHAMPOO TOPICAL
Qty: 120 | Refills: 2 | Status: ERX | COMMUNITY
Start: 2025-05-22

## 2025-05-22 RX ADMIN — CICLOPIROX: 10 SHAMPOO TOPICAL at 00:00

## 2025-05-22 RX ADMIN — BETAMETHASONE DIPROPIONATE: 0.5 OINTMENT TOPICAL at 00:00

## 2025-05-22 ASSESSMENT — LOCATION DETAILED DESCRIPTION DERM
LOCATION DETAILED: RIGHT CENTRAL FRONTAL SCALP
LOCATION DETAILED: LEFT CENTRAL FRONTAL SCALP
LOCATION DETAILED: MID-FRONTAL SCALP
LOCATION DETAILED: RIGHT MEDIAL FRONTAL SCALP
LOCATION DETAILED: RIGHT SUPERIOR FRONTAL SCALP
LOCATION DETAILED: LEFT MEDIAL FRONTAL SCALP
LOCATION DETAILED: LEFT SUPERIOR PARIETAL SCALP

## 2025-05-22 ASSESSMENT — SEVERITY ASSESSMENT: HOW SEVERE IS THIS PATIENT'S CONDITION?: MILD

## 2025-05-22 ASSESSMENT — LOCATION SIMPLE DESCRIPTION DERM
LOCATION SIMPLE: SCALP
LOCATION SIMPLE: LEFT SCALP
LOCATION SIMPLE: RIGHT SCALP
LOCATION SIMPLE: ANTERIOR SCALP

## 2025-05-22 ASSESSMENT — LOCATION ZONE DERM: LOCATION ZONE: SCALP

## 2025-05-22 NOTE — PROCEDURE: PRESCRIPTION MEDICATION MANAGEMENT
Render In Strict Bullet Format?: No
Initiate Treatment: ciclopirox 1 % shampoo \\nQuantity: 120.0 ml  Days Supply: 90\\nSig: Apply to scalp biweekly let sit for 5-10mins then rinse & follow with conditioning shampoo\\n\\nbetamethasone dipropionate 0.05 % topical ointment \\nQuantity: 45.0 g  Days Supply: 30\\nSig: Apply to the scalp 2-3 nights a week.
Detail Level: Zone

## 2025-05-22 NOTE — PROCEDURE: DIAGNOSIS COMMENT
Render Risk Assessment In Note?: no
Comment: Previously on Ciclopirox and Betamethasone ointment. Pt d/c both prescriptions because she does not want to use topicals. Previously recommended rosemary oil. Patient agreed to try to be consistent with topicals.
Detail Level: Simple

## 2025-05-22 NOTE — PROCEDURE: INTRALESIONAL KENALOG
Bill For Wasted Drug (Kenalog)?: no
How Many Mls Were Removed From The 40 Mg/Ml (10ml) Vial When Preparing The Injectable Solution?: 0
How Many Mls Were Removed From The 40 Mg/Ml (5ml) Vial When Preparing The Injectable Solution?: 0.1
Validate Note Data When Using Inventory: Yes
Which Kenalog Vial Was Used?: Kenalog 40 mg/ml (5 ml vial)
Medical Necessity Clause: This procedure was medically necessary because the lesions that were treated were:
Detail Level: Zone
Kenalog Preparation: Kenalog with 1% lidocaine with epinephrine
Kenalog Type Of Vial: Multiple Dose
Administered By (Optional): Adrienne Kumar MD
Consent: The risks of atrophy were reviewed with the patient.
Total Volume (Ccs): 0.6
Concentration Of Kenalog Solution Injected (Mg/Ml): 4.0